# Patient Record
Sex: MALE | Race: BLACK OR AFRICAN AMERICAN
[De-identification: names, ages, dates, MRNs, and addresses within clinical notes are randomized per-mention and may not be internally consistent; named-entity substitution may affect disease eponyms.]

---

## 2021-05-17 ENCOUNTER — HOSPITAL ENCOUNTER (EMERGENCY)
Dept: HOSPITAL 53 - M ED | Age: 36
Discharge: HOME | End: 2021-05-17
Payer: COMMERCIAL

## 2021-05-17 VITALS — HEIGHT: 68 IN | WEIGHT: 175.93 LBS | BODY MASS INDEX: 26.66 KG/M2

## 2021-05-17 VITALS — DIASTOLIC BLOOD PRESSURE: 80 MMHG | SYSTOLIC BLOOD PRESSURE: 118 MMHG

## 2021-05-17 DIAGNOSIS — K59.00: Primary | ICD-10-CM

## 2021-05-17 DIAGNOSIS — I10: ICD-10-CM

## 2021-05-17 DIAGNOSIS — F33.9: ICD-10-CM

## 2021-05-17 DIAGNOSIS — K58.9: ICD-10-CM

## 2021-05-17 DIAGNOSIS — Z79.899: ICD-10-CM

## 2021-05-17 LAB
ALBUMIN SERPL BCG-MCNC: 4.2 GM/DL (ref 3.2–5.2)
ALT SERPL W P-5'-P-CCNC: 33 U/L (ref 12–78)
BASOPHILS # BLD AUTO: 0.1 10^3/UL (ref 0–0.2)
BASOPHILS NFR BLD AUTO: 0.7 % (ref 0–1)
BILIRUB CONJ SERPL-MCNC: 0.2 MG/DL (ref 0–0.2)
BILIRUB SERPL-MCNC: 0.7 MG/DL (ref 0.2–1)
EOSINOPHIL # BLD AUTO: 0.1 10^3/UL (ref 0–0.5)
EOSINOPHIL NFR BLD AUTO: 1.6 % (ref 0–3)
HCT VFR BLD AUTO: 44.4 % (ref 42–52)
HGB BLD-MCNC: 15.2 G/DL (ref 13.5–17.5)
LIPASE SERPL-CCNC: 71 U/L (ref 73–393)
LYMPHOCYTES # BLD AUTO: 2.6 10^3/UL (ref 1.5–5)
LYMPHOCYTES NFR BLD AUTO: 39.2 % (ref 24–44)
MCH RBC QN AUTO: 30.2 PG (ref 27–33)
MCHC RBC AUTO-ENTMCNC: 34.2 G/DL (ref 32–36.5)
MCV RBC AUTO: 88.1 FL (ref 80–96)
MONOCYTES # BLD AUTO: 0.3 10^3/UL (ref 0–0.8)
MONOCYTES NFR BLD AUTO: 3.9 % (ref 2–8)
NEUTROPHILS # BLD AUTO: 3.7 10^3/UL (ref 1.5–8.5)
NEUTROPHILS NFR BLD AUTO: 54.3 % (ref 36–66)
PLATELET # BLD AUTO: 211 10^3/UL (ref 150–450)
PROT SERPL-MCNC: 8.5 GM/DL (ref 6.4–8.2)
RBC # BLD AUTO: 5.04 10^6/UL (ref 4.3–6.1)
WBC # BLD AUTO: 6.7 10^3/UL (ref 4–10)

## 2021-05-17 NOTE — REPVR
PROCEDURE INFORMATION: 



Exam: XR Complete Acute Abdomen Series 

Exam date and time:  5/17/21  (8:01pm) 

Age: 35 years old 

Clinical indication:  Acute abdominal pain



TECHNIQUE: 

Imaging protocol:  XR complete acute abdomen series, including 2 or more views 

of the abdomen and a single view chest 



COMPARISON: 

CT ABDOMEN PELVIS of 9/03/19     



FINDINGS: 

Lungs: Normal. No consolidation. 

Pleural spaces: Normal. No pleural effusionss. No pneumothorax. 

Heart/Mediastinum: Normal. No cardiomegaly. 

Gastrointestinal tract:  Large amount of fecal matter in the right colon and 

proximal transverse colon.

Intraperitoneal space: Normal. No free air. 

Bones/joints:  Normal. No acute fracture. 

Soft tissues:  Normal. 



IMPRESSION: 

No acute findings.

Clear lung fields.

No bowel obstruction.  No free air.  

Possible constipation.  Much fecal matter in the right colon and proximal 

transverse colon.

     Clinical correlation is suggested.  



Electronically signed by: Sweta Covarrubias On 05/17/2021  22:09:38 PM